# Patient Record
Sex: FEMALE | NOT HISPANIC OR LATINO
[De-identification: names, ages, dates, MRNs, and addresses within clinical notes are randomized per-mention and may not be internally consistent; named-entity substitution may affect disease eponyms.]

---

## 2017-08-31 ENCOUNTER — HOSPITAL (OUTPATIENT)
Dept: OTHER | Age: 27
End: 2017-08-31
Attending: SURGERY

## 2017-08-31 ENCOUNTER — CHARTING TRANS (OUTPATIENT)
Dept: OTHER | Age: 27
End: 2017-08-31

## 2018-11-03 VITALS
WEIGHT: 128 LBS | HEART RATE: 96 BPM | DIASTOLIC BLOOD PRESSURE: 71 MMHG | TEMPERATURE: 97.7 F | RESPIRATION RATE: 16 BRPM | SYSTOLIC BLOOD PRESSURE: 104 MMHG | HEIGHT: 64 IN | BODY MASS INDEX: 21.85 KG/M2

## 2018-12-14 ENCOUNTER — WALK IN (OUTPATIENT)
Dept: URGENT CARE | Age: 28
End: 2018-12-14

## 2018-12-14 VITALS
BODY MASS INDEX: 20.32 KG/M2 | HEIGHT: 64 IN | HEART RATE: 74 BPM | TEMPERATURE: 98.4 F | RESPIRATION RATE: 16 BRPM | SYSTOLIC BLOOD PRESSURE: 100 MMHG | DIASTOLIC BLOOD PRESSURE: 60 MMHG | WEIGHT: 119.05 LBS

## 2018-12-14 DIAGNOSIS — H10.022 MUCOPURULENT CONJUNCTIVITIS OF LEFT EYE: Primary | ICD-10-CM

## 2018-12-14 PROCEDURE — X0943 AMG SELF PAY VISIT: HCPCS | Performed by: NURSE PRACTITIONER

## 2018-12-14 RX ORDER — POLYMYXIN B SULFATE AND TRIMETHOPRIM 1; 10000 MG/ML; [USP'U]/ML
1 SOLUTION OPHTHALMIC EVERY 6 HOURS
Qty: 10 ML | Refills: 0 | Status: SHIPPED | INPATIENT
Start: 2018-12-14 | End: 2018-12-21

## 2018-12-14 ASSESSMENT — ENCOUNTER SYMPTOMS
RHINORRHEA: 1
ALLERGIC/IMMUNOLOGIC NEGATIVE: 1
DOUBLE VISION: 0
EYE REDNESS: 1
EYE PAIN: 0
NEUROLOGICAL NEGATIVE: 1
ENDOCRINE NEGATIVE: 1
EYE DISCHARGE: 1
COUGH: 0
SORE THROAT: 0
SHORTNESS OF BREATH: 0
CONSTITUTIONAL NEGATIVE: 1
HEMATOLOGIC/LYMPHATIC NEGATIVE: 1
GASTROINTESTINAL NEGATIVE: 1
PSYCHIATRIC NEGATIVE: 1
PHOTOPHOBIA: 0
EYE ITCHING: 1
FEVER: 0

## 2019-02-13 NOTE — PROGRESS NOTES
CHIEF COMPLAINT:   Patient presents with:  URI      HPI:   Columba Fragoso is a 25year old female who presents with URI symptoms for  2-3 days. Onset was gradual, Location is mid face and throat. Symptoms are progressing.  Described as nasal pressure, sore & EARS: Canals are clear, nodischarge/inflammation, TM's are pearly white and intact, no bulging, bony landmarks are visible. Fluid is present behind both TMs.    NOSE: nostrils patent, nasal mucus present, erythematous nasal mucosa, and turbinates are swoll You have a viral upper respiratory illness (URI), which is another term for the common cold. This illness is contagious during the first few days. It is spread through the air by coughing and sneezing.  It may also be spread by direct contact (touching the · Cough with lots of colored sputum (mucus)  · Severe headache; face, neck, or ear pain  · Difficulty swallowing due to throat pain  · Fever of 100.4°F (38°C) or higher, or as directed by your healthcare provider  Call 911  Call 911 if any of these occur:

## 2019-07-03 ENCOUNTER — TELEPHONE (OUTPATIENT)
Dept: OBGYN | Age: 29
End: 2019-07-03

## 2019-07-08 ENCOUNTER — TELEPHONE (OUTPATIENT)
Dept: OBGYN | Age: 29
End: 2019-07-08

## 2019-07-09 ENCOUNTER — TELEPHONE (OUTPATIENT)
Dept: OBGYN | Age: 29
End: 2019-07-09

## 2019-07-19 ENCOUNTER — LAB SERVICES (OUTPATIENT)
Dept: LAB | Age: 29
End: 2019-07-19

## 2019-07-19 ENCOUNTER — FIRST OB VISIT (OUTPATIENT)
Dept: OBGYN | Age: 29
End: 2019-07-19

## 2019-07-19 VITALS
BODY MASS INDEX: 20.74 KG/M2 | SYSTOLIC BLOOD PRESSURE: 110 MMHG | TEMPERATURE: 97.8 F | HEART RATE: 97 BPM | HEIGHT: 64 IN | DIASTOLIC BLOOD PRESSURE: 70 MMHG | WEIGHT: 121.5 LBS

## 2019-07-19 DIAGNOSIS — Z34.90 PREGNANCY, UNSPECIFIED GESTATIONAL AGE: Primary | ICD-10-CM

## 2019-07-19 DIAGNOSIS — Z34.90 PREGNANCY, UNSPECIFIED GESTATIONAL AGE: ICD-10-CM

## 2019-07-19 LAB
APPEARANCE UR: CLEAR
APPEARANCE, POC: CLEAR
BASOPHILS # BLD AUTO: 0 K/MCL (ref 0–0.3)
BASOPHILS NFR BLD AUTO: 1 %
BILIRUB UR QL STRIP: NEGATIVE
BILIRUBIN, POC: NEGATIVE
COLOR UR: YELLOW
COLOR, POC: NORMAL
DIFFERENTIAL METHOD BLD: ABNORMAL
EOSINOPHIL # BLD AUTO: 0.1 K/MCL (ref 0.1–0.5)
EOSINOPHIL NFR SPEC: 1 %
ERYTHROCYTE [DISTWIDTH] IN BLOOD: 11.7 % (ref 11–15)
GLUCOSE UR STRIP-MCNC: NEGATIVE MG/DL
GLUCOSE UR-MCNC: NEGATIVE MG/DL
HCT VFR BLD CALC: 35.4 % (ref 36–46.5)
HGB BLD-MCNC: 12.3 G/DL (ref 12–15.5)
HGB UR QL STRIP: NEGATIVE
IMM GRANULOCYTES # BLD AUTO: 0 K/MCL (ref 0–0.2)
IMM GRANULOCYTES NFR BLD: 0 %
KETONES UR STRIP-MCNC: ABNORMAL MG/DL
KETONES, POC: NORMAL
LEUKOCYTE ESTERASE UR QL STRIP: NEGATIVE
LYMPHOCYTES # BLD MANUAL: 2.4 K/MCL (ref 1–4.8)
LYMPHOCYTES NFR BLD MANUAL: 27 %
MCH RBC QN AUTO: 30.1 PG (ref 26–34)
MCHC RBC AUTO-ENTMCNC: 34.7 G/DL (ref 32–36.5)
MCV RBC AUTO: 86.8 FL (ref 78–100)
MONOCYTES # BLD MANUAL: 0.5 K/MCL (ref 0.3–0.9)
MONOCYTES NFR BLD MANUAL: 6 %
NEUTROPHILS # BLD: 5.8 K/MCL (ref 1.8–7.7)
NEUTROPHILS NFR BLD AUTO: 65 %
NITRITE UR QL STRIP: NEGATIVE
NITRITE, POC: NEGATIVE
NRBC BLD MANUAL-RTO: 0 /100 WBC
OCCULT BLOOD, POC: NORMAL
PH UR STRIP: 5 UNITS (ref 5–7)
PH UR: 5.5 [PH] (ref 5–7)
PLATELET # BLD: 322 K/MCL (ref 140–450)
PROT UR STRIP-MCNC: NEGATIVE MG/DL
PROT UR-MCNC: NEGATIVE G/DL
RBC # BLD: 4.08 MIL/MCL (ref 4–5.2)
SP GR UR STRIP: 1.02 (ref 1–1.03)
SP GR UR: 1.02 (ref 1–1.03)
SPECIMEN SOURCE: ABNORMAL
UROBILINOGEN UR STRIP-MCNC: 0.2 MG/DL (ref 0–1)
UROBILINOGEN UR-MCNC: 0.2 MG/DL (ref 0–1)
WBC # BLD: 8.8 K/MCL (ref 4.2–11)
WBC (LEUKOCYTE) ESTERASE, POC: NEGATIVE

## 2019-07-19 PROCEDURE — 87086 URINE CULTURE/COLONY COUNT: CPT | Performed by: ADVANCED PRACTICE MIDWIFE

## 2019-07-19 PROCEDURE — 81003 URINALYSIS AUTO W/O SCOPE: CPT | Performed by: ADVANCED PRACTICE MIDWIFE

## 2019-07-19 PROCEDURE — 36415 COLL VENOUS BLD VENIPUNCTURE: CPT | Performed by: ADVANCED PRACTICE MIDWIFE

## 2019-07-19 PROCEDURE — 80081 OBSTETRIC PANEL INC HIV TSTG: CPT | Performed by: ADVANCED PRACTICE MIDWIFE

## 2019-07-19 PROCEDURE — 86787 VARICELLA-ZOSTER ANTIBODY: CPT | Performed by: ADVANCED PRACTICE MIDWIFE

## 2019-07-19 PROCEDURE — 87491 CHLMYD TRACH DNA AMP PROBE: CPT | Performed by: ADVANCED PRACTICE MIDWIFE

## 2019-07-19 PROCEDURE — 0500F INITIAL PRENATAL CARE VISIT: CPT | Performed by: ADVANCED PRACTICE MIDWIFE

## 2019-07-19 PROCEDURE — 87591 N.GONORRHOEAE DNA AMP PROB: CPT | Performed by: ADVANCED PRACTICE MIDWIFE

## 2019-07-20 LAB
ABO + RH BLD: NORMAL
BLD GP AB SCN SERPL QL: NEGATIVE
HBV SURFACE AG SER QL: NEGATIVE
HIV 1+2 AB+HIV1 P24 AG SERPL QL IA: NONREACTIVE
RPR SER QL: NONREACTIVE
RUBV IGG SERPL IA-ACNC: 93.4 UNITS/ML

## 2019-07-21 LAB
BACTERIA UR CULT: NORMAL
REPORT STATUS (RPT): NORMAL
SPECIMEN SOURCE: NORMAL

## 2019-07-22 LAB
C TRACH RRNA SPEC QL NAA+PROBE: NEGATIVE
N GONORRHOEA RRNA SPEC QL NAA+PROBE: NEGATIVE
SPECIMEN SOURCE: NORMAL
VZV IGG SER IA-ACNC: NORMAL

## 2019-07-26 ENCOUNTER — TELEPHONE (OUTPATIENT)
Dept: OBGYN | Age: 29
End: 2019-07-26

## 2019-07-26 ENCOUNTER — APPOINTMENT (OUTPATIENT)
Dept: MATERNAL FETAL MEDICINE | Age: 29
End: 2019-07-26

## 2019-07-26 ENCOUNTER — DOCUMENTATION (OUTPATIENT)
Dept: OBGYN | Age: 29
End: 2019-07-26

## 2019-07-26 ENCOUNTER — TELEPHONE (OUTPATIENT)
Dept: SCHEDULING | Age: 29
End: 2019-07-26

## 2019-07-28 ENCOUNTER — TELEPHONE (OUTPATIENT)
Dept: SCHEDULING | Age: 29
End: 2019-07-28

## 2019-07-30 ENCOUNTER — TELEPHONE (OUTPATIENT)
Dept: SCHEDULING | Age: 29
End: 2019-07-30

## 2019-07-30 ENCOUNTER — TELEPHONE (OUTPATIENT)
Dept: OBGYN | Age: 29
End: 2019-07-30

## 2019-07-30 DIAGNOSIS — O03.9 SPONTANEOUS ABORTION: Primary | ICD-10-CM

## 2019-08-01 ENCOUNTER — OFFICE VISIT (OUTPATIENT)
Dept: OBGYN | Age: 29
End: 2019-08-01

## 2019-08-01 VITALS
BODY MASS INDEX: 21.05 KG/M2 | DIASTOLIC BLOOD PRESSURE: 73 MMHG | SYSTOLIC BLOOD PRESSURE: 106 MMHG | TEMPERATURE: 99 F | RESPIRATION RATE: 18 BRPM | HEIGHT: 64 IN | HEART RATE: 91 BPM | WEIGHT: 123.3 LBS

## 2019-08-01 DIAGNOSIS — O03.9 COMPLETE MISCARRIAGE: Primary | ICD-10-CM

## 2019-08-01 PROCEDURE — 99202 OFFICE O/P NEW SF 15 MIN: CPT | Performed by: OBSTETRICS & GYNECOLOGY

## 2019-08-15 ENCOUNTER — TELEPHONE (OUTPATIENT)
Dept: OBGYN | Age: 29
End: 2019-08-15

## 2019-10-28 ENCOUNTER — TELEPHONE (OUTPATIENT)
Dept: OBGYN | Age: 29
End: 2019-10-28

## 2019-11-04 ENCOUNTER — OFFICE VISIT (OUTPATIENT)
Dept: OBGYN | Age: 29
End: 2019-11-04

## 2019-11-04 ENCOUNTER — LAB SERVICES (OUTPATIENT)
Dept: LAB | Age: 29
End: 2019-11-04

## 2019-11-04 VITALS
DIASTOLIC BLOOD PRESSURE: 88 MMHG | BODY MASS INDEX: 21.6 KG/M2 | WEIGHT: 126.5 LBS | HEART RATE: 93 BPM | TEMPERATURE: 97.9 F | HEIGHT: 64 IN | SYSTOLIC BLOOD PRESSURE: 123 MMHG | RESPIRATION RATE: 18 BRPM

## 2019-11-04 DIAGNOSIS — N92.6 IRREGULAR PERIODS: Primary | ICD-10-CM

## 2019-11-04 DIAGNOSIS — N92.6 IRREGULAR PERIODS: ICD-10-CM

## 2019-11-04 PROCEDURE — 99213 OFFICE O/P EST LOW 20 MIN: CPT | Performed by: OBSTETRICS & GYNECOLOGY

## 2019-11-04 PROCEDURE — 36415 COLL VENOUS BLD VENIPUNCTURE: CPT | Performed by: OBSTETRICS & GYNECOLOGY

## 2019-11-04 PROCEDURE — 83520 IMMUNOASSAY QUANT NOS NONAB: CPT | Performed by: OBSTETRICS & GYNECOLOGY

## 2019-11-07 LAB — MIS SERPL-MCNC: 4.76 NG/ML (ref 0.18–11.71)

## 2019-11-08 ENCOUNTER — TELEPHONE (OUTPATIENT)
Dept: OBGYN | Age: 29
End: 2019-11-08

## 2020-05-12 ENCOUNTER — V-VISIT (OUTPATIENT)
Dept: GENETICS | Age: 30
End: 2020-05-12

## 2020-05-12 DIAGNOSIS — Z14.8 CARRIER OF FRAGILE X SYNDROME: ICD-10-CM

## 2020-05-12 PROCEDURE — 99204 OFFICE O/P NEW MOD 45 MIN: CPT | Performed by: GENETIC COUNSELOR, MS

## 2020-05-14 ENCOUNTER — TELEPHONE (OUTPATIENT)
Dept: OBGYN | Age: 30
End: 2020-05-14

## 2020-05-18 ENCOUNTER — TELEPHONE (OUTPATIENT)
Dept: GENETICS | Age: 30
End: 2020-05-18

## 2020-06-10 PROBLEM — Z14.8 CARRIER OF FRAGILE X CHROMOSOME: Status: ACTIVE | Noted: 2020-06-10

## 2020-11-30 ENCOUNTER — TELEPHONE (OUTPATIENT)
Dept: MATERNAL FETAL MEDICINE | Age: 30
End: 2020-11-30

## 2020-12-01 ENCOUNTER — V-VISIT (OUTPATIENT)
Dept: GENETICS | Age: 30
End: 2020-12-01

## 2020-12-01 DIAGNOSIS — O28.9 ABNORMAL FINDINGS ON ANTENATAL SCREENING: ICD-10-CM

## 2020-12-01 DIAGNOSIS — Z14.8 CARRIER OF FRAGILE X SYNDROME: Primary | ICD-10-CM

## 2020-12-01 PROCEDURE — 99215 OFFICE O/P EST HI 40 MIN: CPT | Performed by: GENETIC COUNSELOR, MS

## 2020-12-11 ENCOUNTER — TELEPHONE (OUTPATIENT)
Dept: MATERNAL FETAL MEDICINE | Age: 30
End: 2020-12-11

## 2020-12-14 ENCOUNTER — OFFICE VISIT (OUTPATIENT)
Dept: MATERNAL FETAL MEDICINE | Age: 30
End: 2020-12-14

## 2020-12-14 ENCOUNTER — APPOINTMENT (OUTPATIENT)
Dept: MATERNAL FETAL MEDICINE | Age: 30
End: 2020-12-14

## 2020-12-14 DIAGNOSIS — Z14.8 CARRIER OF FRAGILE X CHROMOSOME: ICD-10-CM

## 2020-12-14 DIAGNOSIS — Z14.8 CARRIER OF FRAGILE X CHROMOSOME: Primary | ICD-10-CM

## 2020-12-14 DIAGNOSIS — Z3A.11 PREGNANCY WITH 11 COMPLETED WEEKS GESTATION: Primary | ICD-10-CM

## 2020-12-14 DIAGNOSIS — Z3A.11 11 WEEKS GESTATION OF PREGNANCY: ICD-10-CM

## 2020-12-14 PROCEDURE — 76801 OB US < 14 WKS SINGLE FETUS: CPT | Performed by: OBSTETRICS & GYNECOLOGY

## 2020-12-14 PROCEDURE — 59015 CHORION BIOPSY: CPT | Performed by: OBSTETRICS & GYNECOLOGY

## 2020-12-28 ENCOUNTER — DOCUMENTATION (OUTPATIENT)
Dept: GENETICS | Age: 30
End: 2020-12-28

## 2021-01-07 ENCOUNTER — DOCUMENTATION (OUTPATIENT)
Dept: GENETICS | Age: 31
End: 2021-01-07

## 2021-01-08 LAB
FMR1 GENE TARGETED MUTATION ANALYSIS IN AMNIOTIC FLUID BY MOLECULAR GENETICS METHOD: NORMAL
KARYOTYP CVS: NORMAL
MATERNAL CELL CONTAMINATION [IDENTIFIER] IN BLOOD NOMINAL: NORMAL

## 2022-06-01 ENCOUNTER — HOSPITAL ENCOUNTER (OUTPATIENT)
Age: 32
Discharge: HOME | End: 2022-06-01
Payer: COMMERCIAL

## 2022-06-01 VITALS
TEMPERATURE: 98 F | SYSTOLIC BLOOD PRESSURE: 103 MMHG | OXYGEN SATURATION: 99 % | DIASTOLIC BLOOD PRESSURE: 68 MMHG | RESPIRATION RATE: 20 BRPM | HEART RATE: 115 BPM

## 2022-06-01 DIAGNOSIS — J06.9 VIRAL URI: Primary | ICD-10-CM

## 2022-06-01 LAB
S PYO AG THROAT QL: NEGATIVE
SARS-COV-2 RNA RESP QL NAA+PROBE: NOT DETECTED

## 2022-06-01 PROCEDURE — U0002 COVID-19 LAB TEST NON-CDC: HCPCS | Performed by: NURSE PRACTITIONER

## 2022-06-01 PROCEDURE — 87880 STREP A ASSAY W/OPTIC: CPT | Performed by: NURSE PRACTITIONER

## 2022-06-01 PROCEDURE — 99203 OFFICE O/P NEW LOW 30 MIN: CPT | Performed by: NURSE PRACTITIONER

## 2022-10-26 ENCOUNTER — APPOINTMENT (OUTPATIENT)
Dept: GENERAL RADIOLOGY | Age: 32
End: 2022-10-26
Attending: NURSE PRACTITIONER
Payer: COMMERCIAL

## 2022-10-26 ENCOUNTER — HOSPITAL ENCOUNTER (OUTPATIENT)
Age: 32
Discharge: HOME | End: 2022-10-26
Payer: COMMERCIAL

## 2022-10-26 VITALS
TEMPERATURE: 98 F | OXYGEN SATURATION: 100 % | HEART RATE: 120 BPM | DIASTOLIC BLOOD PRESSURE: 70 MMHG | RESPIRATION RATE: 18 BRPM | SYSTOLIC BLOOD PRESSURE: 112 MMHG

## 2022-10-26 DIAGNOSIS — S91.312A FOOT LACERATION, LEFT, INITIAL ENCOUNTER: Primary | ICD-10-CM

## 2022-10-26 PROCEDURE — 99213 OFFICE O/P EST LOW 20 MIN: CPT | Performed by: NURSE PRACTITIONER

## 2022-10-26 PROCEDURE — 12001 RPR S/N/AX/GEN/TRNK 2.5CM/<: CPT | Performed by: NURSE PRACTITIONER

## 2022-10-26 PROCEDURE — 73620 X-RAY EXAM OF FOOT: CPT | Performed by: NURSE PRACTITIONER

## 2022-10-26 NOTE — ED INITIAL ASSESSMENT (HPI)
Pt presents with laceration to bottom of left foot. Pt reports dropping a glass Juanpablo jar on floor and foot was cut. Occurred at approx 830a today. B;leeding is controlled.      1.5 cm in length

## 2022-11-16 ENCOUNTER — V-VISIT (OUTPATIENT)
Dept: GENETICS | Age: 32
End: 2022-11-16

## 2022-11-16 ENCOUNTER — APPOINTMENT (OUTPATIENT)
Dept: GENETICS | Age: 32
End: 2022-11-16

## 2022-11-16 DIAGNOSIS — Z14.8 CARRIER OF FRAGILE X CHROMOSOME: ICD-10-CM

## 2022-11-16 PROCEDURE — 96040 GENETIC COUNSELING 30 MINUTES EACH: CPT | Performed by: GENETIC COUNSELOR, MS

## 2022-12-15 ENCOUNTER — APPOINTMENT (OUTPATIENT)
Dept: GENETICS | Age: 32
End: 2022-12-15

## 2023-10-24 ENCOUNTER — HOSPITAL ENCOUNTER (OUTPATIENT)
Age: 33
Discharge: HOME | End: 2023-10-24

## 2023-10-24 VITALS
SYSTOLIC BLOOD PRESSURE: 108 MMHG | OXYGEN SATURATION: 100 % | RESPIRATION RATE: 18 BRPM | HEART RATE: 117 BPM | DIASTOLIC BLOOD PRESSURE: 67 MMHG | TEMPERATURE: 98 F

## 2023-10-24 DIAGNOSIS — J01.90 ACUTE RHINOSINUSITIS: Primary | ICD-10-CM

## 2023-10-24 DIAGNOSIS — Z34.92 SECOND TRIMESTER PREGNANCY: ICD-10-CM

## 2023-10-24 PROCEDURE — 99213 OFFICE O/P EST LOW 20 MIN: CPT | Performed by: NURSE PRACTITIONER

## 2025-03-04 ENCOUNTER — HOSPITAL ENCOUNTER (OUTPATIENT)
Age: 35
Discharge: ANOTHER HOSPITAL | End: 2025-03-04
Payer: COMMERCIAL

## 2025-03-04 VITALS
RESPIRATION RATE: 20 BRPM | DIASTOLIC BLOOD PRESSURE: 64 MMHG | HEART RATE: 117 BPM | TEMPERATURE: 98 F | SYSTOLIC BLOOD PRESSURE: 128 MMHG | OXYGEN SATURATION: 97 %

## 2025-03-04 DIAGNOSIS — R11.0 NAUSEA: ICD-10-CM

## 2025-03-04 DIAGNOSIS — R10.9 ABDOMINAL PAIN OF UNKNOWN ETIOLOGY: Primary | ICD-10-CM

## 2025-03-04 DIAGNOSIS — R11.2 NAUSEA AND VOMITING IN ADULT: ICD-10-CM

## 2025-03-04 LAB
POCT INFLUENZA A: NEGATIVE
POCT INFLUENZA B: NEGATIVE
SARS-COV-2 RNA RESP QL NAA+PROBE: NOT DETECTED

## 2025-03-04 PROCEDURE — 87502 INFLUENZA DNA AMP PROBE: CPT | Performed by: NURSE PRACTITIONER

## 2025-03-04 PROCEDURE — 99215 OFFICE O/P EST HI 40 MIN: CPT | Performed by: NURSE PRACTITIONER

## 2025-03-04 PROCEDURE — U0002 COVID-19 LAB TEST NON-CDC: HCPCS | Performed by: NURSE PRACTITIONER

## 2025-03-04 PROCEDURE — S0119 ONDANSETRON 4 MG: HCPCS | Performed by: NURSE PRACTITIONER

## 2025-03-04 RX ORDER — ONDANSETRON 4 MG/1
4 TABLET, ORALLY DISINTEGRATING ORAL ONCE
Status: COMPLETED | OUTPATIENT
Start: 2025-03-04 | End: 2025-03-04

## 2025-03-04 NOTE — ED INITIAL ASSESSMENT (HPI)
Pt came in due to runny nose, congestion, NVD, body aches, chills, headache, and fatigue that started this morning. Pt has easy non labored respirations.

## 2025-03-05 NOTE — ED PROVIDER NOTES
Patient Seen in: [facility]      History     Chief Complaint   Patient presents with    Nausea/vomiting     Stated Complaint: vomiting    Subjective:   HPI  Patient is a 34-year-old female who presents to the immediate care center with a concern for nasal congestion, myalgia, chills, headache, fatigue that started at 5:00 this morning.  Later in the morning she started developing nausea.  She has had several episodes of vomiting.  Since, she is experiencing nonspecific, lower abdominal cramping \"like period cramping.\"  She reports that her  and children at home have started to develop similar symptoms since she has arrived here.            Objective:     Past Medical History:    Kidney donor              History reviewed. No pertinent surgical history.             No pertinent social history.            Review of Systems   Constitutional:  Positive for appetite change, chills, fatigue and fever.   HENT:  Positive for congestion.    Respiratory:  Negative for shortness of breath.    Cardiovascular:  Negative for chest pain.   Gastrointestinal:  Positive for abdominal pain, nausea and vomiting. Negative for constipation and diarrhea.   Musculoskeletal:  Positive for myalgias.   Neurological:  Positive for light-headedness and headaches.       Positive for stated complaint: vomiting  Other systems are as noted in HPI.  Constitutional and vital signs reviewed.      All other systems reviewed and negative except as noted above.    Physical Exam     ED Triage Vitals [03/04/25 1546]   /64   Pulse (!) 152   Resp 20   Temp 97.6 °F (36.4 °C)   Temp src Oral   SpO2 97 %   O2 Device None (Room air)       Current Vitals:   Vital Signs  BP: 128/64  Pulse: 117  Resp: 20  Temp: 97.6 °F (36.4 °C)  Temp src: Oral    Oxygen Therapy  SpO2: 97 %  O2 Device: None (Room air)        Physical Exam  Vitals and nursing note reviewed.   Constitutional:       Appearance: She is ill-appearing.   HENT:      Head:  Normocephalic and atraumatic.   Eyes:      Conjunctiva/sclera: Conjunctivae normal.   Cardiovascular:      Rate and Rhythm: Tachycardia present.   Pulmonary:      Effort: Pulmonary effort is normal. No respiratory distress.   Abdominal:      General: Abdomen is flat.      Palpations: Abdomen is soft.      Tenderness: There is no abdominal tenderness. There is no guarding or rebound. Negative signs include McBurney's sign.   Musculoskeletal:      Cervical back: Normal range of motion and neck supple.   Skin:     General: Skin is warm and dry.   Neurological:      Mental Status: She is alert and oriented to person, place, and time.   Psychiatric:         Behavior: Behavior normal.             ED Course     Labs Reviewed   POCT FLU TEST - Normal    Narrative:     This assay is a rapid molecular in vitro test utilizing nucleic acid amplification of influenza A and B viral RNA.   RAPID SARS-COV-2 BY PCR - Normal     Medications   ondansetron (Zofran-ODT) disintegrating tab 4 mg (4 mg Oral Given 3/4/25 1555)       After initial evaluation, we discussed plan of care for evaluation: She had already been given Zofran ODT.  She feels that she can tolerate p.o. fluids.  We discussed the advantage of oral hydration versus IV.  Even before oral hydration her heart rate was down from 150s to 117.  As she is having lower abdominal pain with nausea and vomiting, we discussed her concern about the possibility of pregnancy.  She states that her uterus was possible.  She was informed that we will need urine pregnancy testing to determine next steps (if positive, she will likely need imaging unavailable immediate care center; if negative, we could consider IM [facility] to help with her pain).  She agreed with plan of care.    Patient drink to oral Gatorade's, and 1 water.  She was tolerating those.  However, was still unable to void.    I checked on patient to reevaluate and see if potential invention was necessary while we waited for  urine test.  Patient was found on the floor in the room and the need to talk to position, stating that her abdominal pain is worsening.  Her nausea is worsening, and she had vomited just before this reassessment.  She was informed that in light of this information, she will certainly need resources beyond those that are available in the immediate care center for further evaluation and treatment.                MDM      Differential diagnoses (listed below) were reviewed with patient prior to disposition.  She was informed of significant, potentially negative/life-threatening sequelae were discussed.  Given limited resources in the immediate care center to consider these differentials, she was informed that it is imperative that she go directly from here to the emergency department for further evaluation and treatment.  She states understanding and agrees with plan.  Patient reached out to family member who picked her up for transportation to the emergency department.            Medical Decision Making  Differential diagnoses considered today include, but are not exclusive of: Acute appendicitis, acute cholecystitis, pancreatitis, pelvic inflammatory disease, pregnancy/ectopic pregnancy, ovarian cyst/torsion, colitis and enteritis, dehydration, acute kidney injury.        Problems Addressed:  Abdominal pain of unknown etiology: undiagnosed new problem with uncertain prognosis        Disposition and Plan     Clinical Impression:  1. Abdominal pain of unknown etiology    2. Nausea    3. Nausea and vomiting in adult         Disposition:  Ic to ed  3/4/2025  4:59 pm    Follow-up:  No follow-up provider specified.        Medications Prescribed:  Discharge Medication List as of 3/4/2025  5:15 PM              Supplementary Documentation: